# Patient Record
Sex: FEMALE | Race: WHITE | Employment: PART TIME | URBAN - METROPOLITAN AREA
[De-identification: names, ages, dates, MRNs, and addresses within clinical notes are randomized per-mention and may not be internally consistent; named-entity substitution may affect disease eponyms.]

---

## 2019-10-28 ENCOUNTER — OFFICE VISIT (OUTPATIENT)
Dept: DERMATOLOGY | Facility: CLINIC | Age: 39
End: 2019-10-28
Payer: COMMERCIAL

## 2019-10-28 VITALS
HEIGHT: 66 IN | HEART RATE: 76 BPM | SYSTOLIC BLOOD PRESSURE: 118 MMHG | DIASTOLIC BLOOD PRESSURE: 76 MMHG | WEIGHT: 130 LBS | BODY MASS INDEX: 20.89 KG/M2

## 2019-10-28 DIAGNOSIS — D22.9 NEVUS: Primary | ICD-10-CM

## 2019-10-28 DIAGNOSIS — D18.01 ANGIOMA OF SKIN: ICD-10-CM

## 2019-10-28 DIAGNOSIS — L81.4 LENTIGO: ICD-10-CM

## 2019-10-28 DIAGNOSIS — L82.1 SEBORRHEIC KERATOSIS: ICD-10-CM

## 2019-10-28 PROCEDURE — 99203 OFFICE O/P NEW LOW 30 MIN: CPT | Performed by: PHYSICIAN ASSISTANT

## 2019-10-28 RX ORDER — HYDROXYZINE HYDROCHLORIDE 10 MG/1
10-20 TABLET, FILM COATED ORAL
COMMUNITY
Start: 2018-10-01

## 2019-10-28 RX ORDER — DIPHENHYDRAMINE HCL 25 MG
25 CAPSULE ORAL
COMMUNITY
Start: 2013-07-12

## 2019-10-28 RX ORDER — BUPROPION HYDROCHLORIDE 150 MG/1
150 TABLET ORAL
COMMUNITY
Start: 2018-10-01

## 2019-10-28 ASSESSMENT — MIFFLIN-ST. JEOR: SCORE: 1273.49

## 2019-10-28 NOTE — PROGRESS NOTES
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   On review of systems, there are no further skin complaints, patient is feeling otherwise well.  See patient intake sheet.  ROS of the following were done and are negative: Constitutional, Eyes, Ears, Nose,   Mouth, Throat, Cardiovascular, Respiratory, GI, Genitourinary, Musculoskeletal,   Psychiatric, Endocrine, Allergic/Immunologic.    PHYSICAL EXAM:   /76   Pulse 76   Ht 1.664 m (5' 5.5\")   Wt 59 kg (130 lb)   BMI 21.30 kg/m    Skin exam performed as follows: Type 2 skin. Mood appropriate  Alert and Oriented X 3. Well developed, well nourished in no distress.  General appearance: Normal  Head including face: Normal  Eyes: conjunctiva and lids: Normal  Mouth: Lips, teeth, gums: Normal  Neck: Normal  Chest-breast/axillae: Normal  Back: Normal  Spleen and liver: Normal  Cardiovascular: Exam of peripheral vascular system by observation for swelling, varicosities, edema: Normal  Genitalia: groin, buttocks: Normal  Extremities: digits/nails (clubbing): Normal  Eccrine and Apocrine glands: Normal  Right upper extremity: Normal  Left upper extremity: Normal  Right lower extremity: Normal  Left lower extremity: Normal  Skin: Scalp and body hair: See below    Pt deferred exam of breasts, groin, buttocks: No    Other physical findings:  1. Multiple pigmented macules on extremities and trunk  2. Multiple pigmented macules on " face, trunk and extremities  3. Multiple vascular papules on trunk, arms and legs  4. Multiple scattered keratotic plaques       Except as noted above, no other signs of skin cancer or melanoma.     ASSESSMENT/PLAN:   Benign Full skin cancer screening today. . Patient with history of none  Advised on monthly self exams and 1 year  Patient Education: Appropriate brochures given.    1. Multiple benign appearing nevi on arms, legs and trunk. Discussed ABCDEs of melanoma and sunscreen.   2. Multiple lentigos on arms, legs and trunk. Advised benign, no treatment needed.  3. Multiple scattered angiomas. Advised benign, no treatment needed.   4. Seborrheic keratosis on arms, legs and trunk. Advised benign, no treatment needed.            Follow-up: Skin exam every 1-2 years/PRN sooner    1.) Patient was asked about new and changing moles. YES  2.) Patient received a complete physical skin examination: YES  3.) Patient was counseled to perform a monthly self skin examination: YES  Scribed By: Brenda Alberts MS, PA-C

## 2019-10-28 NOTE — LETTER
"    10/28/2019         RE: Gayle Dawkins  [address] [place] [address]        Dear Colleague,    Thank you for referring your patient, Gayle Dawkins, to the [facility]. Please see a copy of my visit note below.    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   On review of systems, there are no further skin complaints, patient is feeling otherwise well.  See patient intake sheet.  ROS of the following were done and are negative: Constitutional, Eyes, Ears, Nose,   Mouth, Throat, Cardiovascular, Respiratory, GI, Genitourinary, Musculoskeletal,   Psychiatric, Endocrine, Allergic/Immunologic.    PHYSICAL EXAM:   /76   Pulse 76   Ht 1.664 m (5' 5.5\")   Wt 59 kg (130 lb)   BMI 21.30 kg/m     Skin exam performed as follows: Type 2 skin. Mood appropriate  Alert and Oriented X 3. Well developed, well nourished in no distress.  General appearance: Normal  Head including face: Normal  Eyes: conjunctiva and lids: Normal  Mouth: Lips, teeth, gums: Normal  Neck: Normal  Chest-breast/axillae: Normal  Back: Normal  Spleen and liver: Normal  Cardiovascular: Exam of peripheral vascular system by observation for swelling, varicosities, edema: Normal  Genitalia: groin, buttocks: Normal  Extremities: digits/nails (clubbing): Normal  Eccrine and Apocrine glands: Normal  Right upper extremity: Normal  Left upper extremity: Normal  Right lower " extremity: Normal  Left lower extremity: Normal  Skin: Scalp and body hair: See below    Pt deferred exam of breasts, groin, buttocks: No    Other physical findings:  1. Multiple pigmented macules on extremities and trunk  2. Multiple pigmented macules on face, trunk and extremities  3. Multiple vascular papules on trunk, arms and legs  4. Multiple scattered keratotic plaques       Except as noted above, no other signs of skin cancer or melanoma.     ASSESSMENT/PLAN:   Benign Full skin cancer screening today. . Patient with history of none  Advised on monthly self exams and 1 year  Patient Education: Appropriate brochures given.    1. Multiple benign appearing nevi on arms, legs and trunk. Discussed ABCDEs of melanoma and sunscreen.   2. Multiple lentigos on arms, legs and trunk. Advised benign, no treatment needed.  3. Multiple scattered angiomas. Advised benign, no treatment needed.   4. Seborrheic keratosis on arms, legs and trunk. Advised benign, no treatment needed.            Follow-up: Skin exam every 1-2 years/PRN sooner    1.) Patient was asked about new and changing moles. YES  2.) Patient received a complete physical skin examination: YES  3.) Patient was counseled to perform a monthly self skin examination: YES  Scribed By: Brenda Alberts, MS, PAARTUR        Again, thank you for allowing me to participate in the care of your patient.        Sincerely,        Brenda Alberts PA-C

## 2019-10-28 NOTE — NURSING NOTE
"Initial /76   Pulse 76   Ht 1.664 m (5' 5.5\")   Wt 59 kg (130 lb)   BMI 21.30 kg/m   Estimated body mass index is 21.3 kg/m  as calculated from the following:    Height as of this encounter: 1.664 m (5' 5.5\").    Weight as of this encounter: 59 kg (130 lb). .      "

## 2021-09-09 ENCOUNTER — OFFICE VISIT (OUTPATIENT)
Dept: SURGERY | Facility: CLINIC | Age: 41
End: 2021-09-09
Payer: COMMERCIAL

## 2021-09-09 VITALS
WEIGHT: 130 LBS | HEART RATE: 84 BPM | RESPIRATION RATE: 16 BRPM | HEIGHT: 66 IN | OXYGEN SATURATION: 100 % | BODY MASS INDEX: 20.89 KG/M2

## 2021-09-09 DIAGNOSIS — K43.2 INCISIONAL HERNIA, WITHOUT OBSTRUCTION OR GANGRENE: Primary | ICD-10-CM

## 2021-09-09 PROCEDURE — 99203 OFFICE O/P NEW LOW 30 MIN: CPT | Performed by: SURGERY

## 2021-09-09 ASSESSMENT — MIFFLIN-ST. JEOR: SCORE: 1263.49

## 2021-09-09 NOTE — LETTER
"2021    RE: Gayle Dawkins, : 1980    Gayle is a 41 year old White female who presents for hernia evaluation. The patient has noticed a bulge. Pain has been present. Symptoms are described as pressure  located in the epigastric region particularly with sit ups.  She reports that she felt a \"pop\" sensation shortly after her gallbladder surgery in .  She notes a bulge that intermittently develops in the region of her subxiphoid incision.  The bulge is about as large as half a golf ball.  She specifically reports that she has no symptoms or bulges at her umbilicus.  Associated with this  is none.  Pt has had previous ABD surgery including laparoscopic cholecystectomy, bladder sling and umbilical hernia repair.  Patient does report that increased activity/lifting causes pain. Employment does not require lifting.     Constipation No  Dysuria No  Cough No  Smoking No  Diabetes No     Pt's chart has been reviewed for FH,  PMH, PSH, allergies, medications and social history.     ROS:  Pulm:  No shortness of breath, dyspnea on exertion, cough, or hemoptysis  CV:  negative  ABD:  See chief complaint  :  Negative  All other systems negative     Physical exam:  Patient able to get up on table without difficulty.  Head eyes, nose and mouth within normal limits.  Skin - no jaundice  Sclera are clear  No supraclavicular or cervical adenopathy noted.  Neck shows no gross mass  Neurologic: alert, speech is clear, moves all extremities with good strength  Psychiatric: Mood and affect are appropriate  Respirations are regular and non labored  Abdomen is abdomen is soft without significant tenderness, masses, organomegaly or guarding  bowel sounds are positive and no caput medusa noted.  Hernia not clinically present today but she identifies it at the site of her subxiphoid incision     Imaging  US Yes, confirms a fat containing hernia      Assesment: upper ventral wall incisional     Plan: Discussed " observation, external support, possible progression, incarceration and strangulation signs and symptoms and need for immediate treatment if they develop.  Discussed surgery in detail, including risk, benefits, complications, incision/cosmetics, mesh, infection (possibly requiring removal of the mesh), chronic pain, involvement of inta-abdominal organs, lifting and activity limits after surgery. Gave literature to review. Will schedule surgery in near future.          Yaniv Hill MD

## 2021-09-09 NOTE — PROGRESS NOTES
"Gayle is a 41 year old White female who presents for hernia evaluation. The patient has noticed a bulge. Pain has been present. Symptoms are described as pressure  located in the epigastric region particularly with sit ups.  She reports that she felt a \"pop\" sensation shortly after her gallbladder surgery in 2012.  She notes a bulge that intermittently develops in the region of her subxiphoid incision.  The bulge is about as large as half a golf ball.  She specifically reports that she has no symptoms or bulges at her umbilicus.  Associated with this  is none.  Pt has had previous ABD surgery including laparoscopic cholecystectomy, bladder sling and umbilical hernia repair.  Patient does report that increased activity/lifting causes pain. Employment does not require lifting.    Constipation No  Dysuria No  Cough No  Smoking No  Diabetes No    Pt's chart has been reviewed for FH,  PMH, PSH, allergies, medications and social history.    ROS:  Pulm:  No shortness of breath, dyspnea on exertion, cough, or hemoptysis  CV:  negative  ABD:  See chief complaint  :  Negative  All other systems negative    Physical exam:  Patient able to get up on table without difficulty.  Head eyes, nose and mouth within normal limits.  Skin - no jaundice  Sclera are clear  No supraclavicular or cervical adenopathy noted.  Neck shows no gross mass  Neurologic: alert, speech is clear, moves all extremities with good strength  Psychiatric: Mood and affect are appropriate  Respirations are regular and non labored  Abdomen is abdomen is soft without significant tenderness, masses, organomegaly or guarding  bowel sounds are positive and no caput medusa noted.  Hernia not clinically present today but she identifies it at the site of her subxiphoid incision    Imaging  US Yes, confirms a fat containing hernia      Assesment: upper ventral wall incisional    Plan: Discussed observation, external support, possible progression, incarceration and " strangulation signs and symptoms and need for immediate treatment if they develop.  Discussed surgery in detail, including risk, benefits, complications, incision/cosmetics, mesh, infection (possibly requiring removal of the mesh), chronic pain, involvement of inta-abdominal organs, lifting and activity limits after surgery. Gave literature to review. Will schedule surgery in near future.    30   minutes spent on the date of the encounter in chart review, review of test results, patient visit, physical exam, education, counseling, development of care plan and documentation.    Yaniv Hill MD  9/9/2021 3:30 PM    Please route or send letter to:  Primary Care Provider (PCP) and Include Progress Note    Level 4

## 2021-09-15 ENCOUNTER — TELEPHONE (OUTPATIENT)
Dept: SURGERY | Facility: CLINIC | Age: 41
End: 2021-09-15

## 2021-09-15 NOTE — TELEPHONE ENCOUNTER
Type of surgery: INCISIONAL HERNIA REPAIR WITH MESH  4.3 & 6.4 [address] PATCHES   Location of surgery: [facility]  Date and time of surgery: 11/3/2021 @ 7:00 am DJM to start at 9:00    Surgeon: TED KINCAID MD    Pre-Op Appt Date: PATIENT TO SCHEDULE    Post-Op Appt Date: PATIENT TO SCHEDULE     Packet sent out: Yes  Pre-cert/Authorization completed:  Not Applicable  Date: 9/14/2021       [facility]- [address] DONG PANNICU[facility]REG[facility] 120 MIN REQ- DR KINCAID TO FOLLOW WITH INCISIONAL HERNIA REPAIR WITH MESH  4.3 & 6.4 [address] PATCHES  30  MIN REQ PA ASSIST DJM NMS   DR KINCAID TO START AT 9:00

## 2021-10-23 ENCOUNTER — HEALTH MAINTENANCE LETTER (OUTPATIENT)
Age: 41
End: 2021-10-23

## 2021-11-03 ENCOUNTER — TRANSFERRED RECORDS (OUTPATIENT)
Dept: SURGERY | Facility: CLINIC | Age: 41
End: 2021-11-03

## 2022-10-09 ENCOUNTER — HEALTH MAINTENANCE LETTER (OUTPATIENT)
Age: 42
End: 2022-10-09

## 2022-11-26 ENCOUNTER — HEALTH MAINTENANCE LETTER (OUTPATIENT)
Age: 42
End: 2022-11-26

## 2024-01-06 ENCOUNTER — HEALTH MAINTENANCE LETTER (OUTPATIENT)
Age: 44
End: 2024-01-06

## 2024-03-16 ENCOUNTER — HEALTH MAINTENANCE LETTER (OUTPATIENT)
Age: 44
End: 2024-03-16